# Patient Record
Sex: MALE | Race: WHITE | NOT HISPANIC OR LATINO | Employment: FULL TIME | URBAN - METROPOLITAN AREA
[De-identification: names, ages, dates, MRNs, and addresses within clinical notes are randomized per-mention and may not be internally consistent; named-entity substitution may affect disease eponyms.]

---

## 2022-03-28 ENCOUNTER — APPOINTMENT (OUTPATIENT)
Dept: CT IMAGING | Facility: CLINIC | Age: 27
End: 2022-03-28
Attending: EMERGENCY MEDICINE
Payer: COMMERCIAL

## 2022-03-28 ENCOUNTER — HOSPITAL ENCOUNTER (EMERGENCY)
Facility: CLINIC | Age: 27
Discharge: HOME | End: 2022-03-28
Attending: EMERGENCY MEDICINE | Admitting: EMERGENCY MEDICINE
Payer: COMMERCIAL

## 2022-03-28 ENCOUNTER — APPOINTMENT (OUTPATIENT)
Dept: GENERAL RADIOLOGY | Facility: CLINIC | Age: 27
End: 2022-03-28
Attending: EMERGENCY MEDICINE
Payer: COMMERCIAL

## 2022-03-28 VITALS
RESPIRATION RATE: 18 BRPM | TEMPERATURE: 98.3 F | HEART RATE: 61 BPM | SYSTOLIC BLOOD PRESSURE: 106 MMHG | OXYGEN SATURATION: 100 % | DIASTOLIC BLOOD PRESSURE: 76 MMHG

## 2022-03-28 DIAGNOSIS — J06.9 UPPER RESPIRATORY TRACT INFECTION, UNSPECIFIED TYPE: ICD-10-CM

## 2022-03-28 DIAGNOSIS — R55 VASOVAGAL SYNCOPE: ICD-10-CM

## 2022-03-28 LAB
ALBUMIN UR-MCNC: NEGATIVE MG/DL
ANION GAP SERPL CALCULATED.3IONS-SCNC: 2 MMOL/L (ref 3–14)
APPEARANCE UR: CLEAR
ATRIAL RATE - MUSE: 76 BPM
BASOPHILS # BLD AUTO: 0.1 10E3/UL (ref 0–0.2)
BASOPHILS NFR BLD AUTO: 0 %
BILIRUB UR QL STRIP: NEGATIVE
BUN SERPL-MCNC: 14 MG/DL (ref 7–30)
CALCIUM SERPL-MCNC: 9.1 MG/DL (ref 8.5–10.1)
CHLORIDE BLD-SCNC: 106 MMOL/L (ref 94–109)
CO2 SERPL-SCNC: 27 MMOL/L (ref 20–32)
COLOR UR AUTO: ABNORMAL
CREAT SERPL-MCNC: 0.78 MG/DL (ref 0.66–1.25)
DEPRECATED S PYO AG THROAT QL EIA: NEGATIVE
DIASTOLIC BLOOD PRESSURE - MUSE: NORMAL MMHG
EOSINOPHIL # BLD AUTO: 0.1 10E3/UL (ref 0–0.7)
EOSINOPHIL NFR BLD AUTO: 0 %
ERYTHROCYTE [DISTWIDTH] IN BLOOD BY AUTOMATED COUNT: 11.9 % (ref 10–15)
FLUAV RNA SPEC QL NAA+PROBE: NEGATIVE
FLUBV RNA RESP QL NAA+PROBE: NEGATIVE
GFR SERPL CREATININE-BSD FRML MDRD: >90 ML/MIN/1.73M2
GLUCOSE BLD-MCNC: 112 MG/DL (ref 70–99)
GLUCOSE UR STRIP-MCNC: NEGATIVE MG/DL
GROUP A STREP BY PCR: NOT DETECTED
HCT VFR BLD AUTO: 47 % (ref 40–53)
HGB BLD-MCNC: 15.9 G/DL (ref 13.3–17.7)
HGB UR QL STRIP: NEGATIVE
IMM GRANULOCYTES # BLD: 0.2 10E3/UL
IMM GRANULOCYTES NFR BLD: 1 %
INTERPRETATION ECG - MUSE: NORMAL
KETONES UR STRIP-MCNC: NEGATIVE MG/DL
LEUKOCYTE ESTERASE UR QL STRIP: NEGATIVE
LYMPHOCYTES # BLD AUTO: 1.2 10E3/UL (ref 0.8–5.3)
LYMPHOCYTES NFR BLD AUTO: 4 %
MCH RBC QN AUTO: 28.7 PG (ref 26.5–33)
MCHC RBC AUTO-ENTMCNC: 33.8 G/DL (ref 31.5–36.5)
MCV RBC AUTO: 85 FL (ref 78–100)
MONOCYTES # BLD AUTO: 1.7 10E3/UL (ref 0–1.3)
MONOCYTES NFR BLD AUTO: 6 %
MONOCYTES NFR BLD AUTO: NEGATIVE %
MUCOUS THREADS #/AREA URNS LPF: PRESENT /LPF
NEUTROPHILS # BLD AUTO: 24.7 10E3/UL (ref 1.6–8.3)
NEUTROPHILS NFR BLD AUTO: 89 %
NITRATE UR QL: NEGATIVE
NRBC # BLD AUTO: 0 10E3/UL
NRBC BLD AUTO-RTO: 0 /100
P AXIS - MUSE: 59 DEGREES
PH UR STRIP: 7.5 [PH] (ref 5–7)
PLATELET # BLD AUTO: 216 10E3/UL (ref 150–450)
POTASSIUM BLD-SCNC: 4.1 MMOL/L (ref 3.4–5.3)
PR INTERVAL - MUSE: 148 MS
QRS DURATION - MUSE: 94 MS
QT - MUSE: 352 MS
QTC - MUSE: 396 MS
R AXIS - MUSE: 84 DEGREES
RBC # BLD AUTO: 5.54 10E6/UL (ref 4.4–5.9)
RBC URINE: <1 /HPF
SARS-COV-2 RNA RESP QL NAA+PROBE: NEGATIVE
SODIUM SERPL-SCNC: 135 MMOL/L (ref 133–144)
SP GR UR STRIP: 1.02 (ref 1–1.03)
SYSTOLIC BLOOD PRESSURE - MUSE: NORMAL MMHG
T AXIS - MUSE: 61 DEGREES
UROBILINOGEN UR STRIP-MCNC: NORMAL MG/DL
VENTRICULAR RATE- MUSE: 76 BPM
WBC # BLD AUTO: 28 10E3/UL (ref 4–11)
WBC URINE: 1 /HPF

## 2022-03-28 PROCEDURE — 250N000011 HC RX IP 250 OP 636: Performed by: EMERGENCY MEDICINE

## 2022-03-28 PROCEDURE — 99285 EMERGENCY DEPT VISIT HI MDM: CPT | Mod: 25

## 2022-03-28 PROCEDURE — 87636 SARSCOV2 & INF A&B AMP PRB: CPT | Performed by: EMERGENCY MEDICINE

## 2022-03-28 PROCEDURE — 93005 ELECTROCARDIOGRAM TRACING: CPT

## 2022-03-28 PROCEDURE — 81003 URINALYSIS AUTO W/O SCOPE: CPT | Performed by: EMERGENCY MEDICINE

## 2022-03-28 PROCEDURE — 80048 BASIC METABOLIC PNL TOTAL CA: CPT | Performed by: EMERGENCY MEDICINE

## 2022-03-28 PROCEDURE — 71046 X-RAY EXAM CHEST 2 VIEWS: CPT

## 2022-03-28 PROCEDURE — 36415 COLL VENOUS BLD VENIPUNCTURE: CPT | Performed by: EMERGENCY MEDICINE

## 2022-03-28 PROCEDURE — 87651 STREP A DNA AMP PROBE: CPT | Performed by: EMERGENCY MEDICINE

## 2022-03-28 PROCEDURE — 86308 HETEROPHILE ANTIBODY SCREEN: CPT | Performed by: EMERGENCY MEDICINE

## 2022-03-28 PROCEDURE — 85025 COMPLETE CBC W/AUTO DIFF WBC: CPT | Performed by: EMERGENCY MEDICINE

## 2022-03-28 PROCEDURE — 70450 CT HEAD/BRAIN W/O DYE: CPT

## 2022-03-28 PROCEDURE — C9803 HOPD COVID-19 SPEC COLLECT: HCPCS

## 2022-03-28 RX ORDER — AZITHROMYCIN 250 MG/1
TABLET, FILM COATED ORAL
Qty: 6 TABLET | Refills: 0 | Status: SHIPPED | OUTPATIENT
Start: 2022-03-28 | End: 2022-04-02

## 2022-03-28 RX ORDER — ONDANSETRON 4 MG/1
4 TABLET, ORALLY DISINTEGRATING ORAL ONCE
Status: COMPLETED | OUTPATIENT
Start: 2022-03-28 | End: 2022-03-28

## 2022-03-28 RX ADMIN — ONDANSETRON 4 MG: 4 TABLET, ORALLY DISINTEGRATING ORAL at 09:43

## 2022-03-28 ASSESSMENT — ENCOUNTER SYMPTOMS
MYALGIAS: 1
COUGH: 1

## 2022-03-28 NOTE — Clinical Note
Jose Cooley was seen and treated in our emergency department on 3/28/2022.  He may return to work on 03/31/2022.       If you have any questions or concerns, please don't hesitate to call.      Price Horton MD

## 2022-03-28 NOTE — ED PROVIDER NOTES
History   Chief Complaint:  Syncope       HPI   Jose Cooley is an otherwise healthy 27 year old male who presents after a syncopal episode. The patient reports that he began feeling nauseous immediately after urinating this morning and subsequently passed out. He fell backwards and hit his head on the tub during this episode. All of this occurred shortly after he woke up this morning. Here in the ED, he states that the top off his head hurts. He also endorses pain in all four extremities and states that he has noticed a cough and congestion since the episode occurred. The patient states that he otherwise felt normal prior to going to bed last night. Denies personal or family history of frequent syncopal episodes. Also denies recent long travel, significant diet changes, or known sick contacts. He has not been COVID vaccinated.     Review of Systems   HENT: Positive for congestion.    Respiratory: Positive for cough.    Musculoskeletal: Positive for myalgias.   Neurological: Positive for syncope.        Endorses head trauma and loss of consciousness   All other systems reviewed and are negative.        Allergies:  No known drug allergies     Medications:  No known current medications     Past Medical History:     No known past medical history     Past Surgical History:    No known past surgical history     Family History:    No known family history     Social History:  Endorses occasional alcohol use  Denies smoking tobacco    Physical Exam     Patient Vitals for the past 24 hrs:   BP Temp Pulse Resp SpO2   03/28/22 1205 106/76 -- 61 18 100 %   03/28/22 0743 131/74 98.3  F (36.8  C) 106 18 100 %       Physical Exam  Constitutional:       General: He is not in acute distress.     Appearance: He is not diaphoretic.   HENT:      Head: Atraumatic.   Eyes:      General: No scleral icterus.     Pupils: Pupils are equal, round, and reactive to light.   Cardiovascular:      Heart sounds: Normal heart sounds.    Pulmonary:      Effort: No respiratory distress.      Breath sounds: Normal breath sounds.   Abdominal:      General: Bowel sounds are normal.      Palpations: Abdomen is soft.      Tenderness: There is no abdominal tenderness.   Musculoskeletal:         General: No tenderness.   Skin:     General: Skin is warm.      Findings: No rash.           Emergency Department Course   ECG  ECG obtained at 0932, ECG read at 0934  Normal sinus rhythm  Normal ECG   Rate 76 bpm. MA interval 148 ms. QRS duration 94 ms. QT/QTc 352/396 ms. P-R-T axes 59 84 61.     Imaging:  XR Chest 2 Views   Final Result   IMPRESSION: Normal two views of the chest.       DOMINGA RIBERA MD            SYSTEM ID:  [id number]      CT Head w/o Contrast   Final Result   IMPRESSION:   No intracranial hemorrhage, mass, or definite CT evidence of recent   ischemia.      AMBER DAMON MD            SYSTEM ID:  [id number]        Report per radiology    Laboratory:  Labs Ordered and Resulted from Time of ED Arrival to Time of ED Departure   BASIC METABOLIC PANEL - Abnormal       Result Value    Sodium 135      Potassium 4.1      Chloride 106      Carbon Dioxide (CO2) 27      Anion Gap 2 (*)     Urea Nitrogen 14      Creatinine 0.78      Calcium 9.1      Glucose 112 (*)     GFR Estimate >90     ROUTINE UA WITH MICROSCOPIC REFLEX TO CULTURE - Abnormal    Color Urine Light Yellow      Appearance Urine Clear      Glucose Urine Negative      Bilirubin Urine Negative      Ketones Urine Negative      Specific Gravity Urine 1.025      Blood Urine Negative      pH Urine 7.5 (*)     Protein Albumin Urine Negative      Urobilinogen Urine Normal      Nitrite Urine Negative      Leukocyte Esterase Urine Negative      Mucus Urine Present (*)     RBC Urine <1      WBC Urine 1     CBC WITH PLATELETS AND DIFFERENTIAL - Abnormal    WBC Count 28.0 (*)     RBC Count 5.54      Hemoglobin 15.9      Hematocrit 47.0      MCV 85      MCH 28.7      MCHC 33.8      RDW 11.9       Platelet Count 216      % Neutrophils 89      % Lymphocytes 4      % Monocytes 6      % Eosinophils 0      % Basophils 0      % Immature Granulocytes 1      NRBCs per 100 WBC 0      Absolute Neutrophils 24.7 (*)     Absolute Lymphocytes 1.2      Absolute Monocytes 1.7 (*)     Absolute Eosinophils 0.1      Absolute Basophils 0.1      Absolute Immature Granulocytes 0.2      Absolute NRBCs 0.0     INFLUENZA A/B & SARS-COV2 PCR MULTIPLEX - Normal    Influenza A PCR Negative      Influenza B PCR Negative      SARS CoV2 PCR Negative     MONONUCLEOSIS SCREEN - Normal    Mononucleosis Screen Negative     STREPTOCOCCUS A RAPID SCREEN W REFELX TO PCR - Normal    Group A Strep antigen Negative     GROUP A STREPTOCOCCUS PCR THROAT SWAB      Emergency Department Course:    Reviewed:  I reviewed nursing notes, vitals, past medical history and Care Everywhere    Assessments:  0918 I obtained history and examined the patient as noted above.   1052 I rechecked the patient and explained findings.   1218 I rechecked the patient.     Interventions:  Medications   ondansetron (ZOFRAN-ODT) ODT tab 4 mg (4 mg Oral Given 3/28/22 0943)     Disposition:  The patient was discharged to home.     Impression & Plan     Medical Decision Making:  This patient is a 27-year-old man who presents to the emergency department for evaluation of a syncopal episode.  He felt well yesterday.  On awakening today the patient says that he was coughing and felt generally lightheaded.  He had post micturition syncope and struck his head.  Was no wound on the scalp.  Patient complains of a headache but head CT is negative.  There is no personal or family history of prolonged QT, WPW, Brugada syndrome, or left ventricular hypertrophy.  This is not evident on his EKG.  Intervals are normal.    Laboratory work-up demonstrates a leukocytosis with a white count of 28.  Differential however is normal and there is no bandemia.  Is not clear whether this is leukocyte  demargination following the syncopal episode or whether may be related to his cough.  He does not have any nuchal rigidity or confusion to suggest CNS infection.  Chest x-ray does not demonstrate pneumonia.  Covid and influenza swabs negative as well as strep and mononucleosis.  Urine does not demonstrate evidence of an infection.  There are no abdominal tenderness or vomiting to suggest an intra-abdominal infection.    The patient has been able to orally hydrate.  He rested here in the ED.  He is afebrile and he overall feels improved.  Given his cough and leukocytosis and the syncope today he will be covered with azithromycin.  We discussed very specific return precautions in terms of a potentially worsening infection.            Diagnosis:    ICD-10-CM    1. Vasovagal syncope  R55    2. Upper respiratory tract infection, unspecified type  J06.9        Discharge Medications:  Discharge Medication List as of 3/28/2022 12:22 PM      START taking these medications    Details   azithromycin (ZITHROMAX Z-CHRISTOPHER) 250 MG tablet Two tablets on the first day, then one tablet daily for the next 4 days, Disp-6 tablet, R-0, E-Prescribe             Scribe Disclosure:  Rafael RUSS, am serving as a scribe at 9:18 AM on 3/28/2022 to document services personally performed by Price Horton MD based on my observations and the provider's statements to me.              Price Horton MD  03/28/22 [id number]

## 2022-03-28 NOTE — DISCHARGE INSTRUCTIONS
Return to the ER for worsening pain, fever, difficulty breathing, worsening dizziness or recurrent loss of consciousness, or for any new concerns.    Follow-up with your physician in 2 to 3 days for recheck.    Drink plenty of fluids to stay hydrated.

## 2022-03-28 NOTE — ED TRIAGE NOTES
Patient presents with complaints of syncopal episode this morning. Patient states he got up to use the bathroom and passed out while standing. Patient states he did hit his head. Denies any chest pain, SOB, or cervical tenderness. ABC intact without need for intervention at this time.

## 2022-08-17 ENCOUNTER — OFFICE VISIT (OUTPATIENT)
Dept: URGENT CARE | Facility: URGENT CARE | Age: 27
End: 2022-08-17

## 2022-08-17 VITALS
SYSTOLIC BLOOD PRESSURE: 120 MMHG | DIASTOLIC BLOOD PRESSURE: 78 MMHG | TEMPERATURE: 98 F | RESPIRATION RATE: 20 BRPM | OXYGEN SATURATION: 98 % | HEART RATE: 78 BPM

## 2022-08-17 DIAGNOSIS — J06.9 VIRAL URI WITH COUGH: Primary | ICD-10-CM

## 2022-08-17 LAB
DEPRECATED S PYO AG THROAT QL EIA: NEGATIVE
GROUP A STREP BY PCR: NOT DETECTED
SARS-COV-2 RNA RESP QL NAA+PROBE: NEGATIVE

## 2022-08-17 PROCEDURE — 87651 STREP A DNA AMP PROBE: CPT | Performed by: PHYSICIAN ASSISTANT

## 2022-08-17 PROCEDURE — U0005 INFEC AGEN DETEC AMPLI PROBE: HCPCS | Performed by: PHYSICIAN ASSISTANT

## 2022-08-17 PROCEDURE — U0003 INFECTIOUS AGENT DETECTION BY NUCLEIC ACID (DNA OR RNA); SEVERE ACUTE RESPIRATORY SYNDROME CORONAVIRUS 2 (SARS-COV-2) (CORONAVIRUS DISEASE [COVID-19]), AMPLIFIED PROBE TECHNIQUE, MAKING USE OF HIGH THROUGHPUT TECHNOLOGIES AS DESCRIBED BY CMS-2020-01-R: HCPCS | Performed by: PHYSICIAN ASSISTANT

## 2022-08-17 PROCEDURE — 99203 OFFICE O/P NEW LOW 30 MIN: CPT | Mod: CS | Performed by: PHYSICIAN ASSISTANT

## 2022-08-17 NOTE — LETTER
[facility] [place]  [address]  [address]  [place] [address]  Phone: [phone]  Fax: [phone]    August 17, 2022        Jose Cooley  [id number]  [facility] [address]          To whom it may concern:    RE: Jose Cooley    Patient was seen and treated today at our clinic. Please excuse for missed work 8/15/22 - 8/18/22.  May return to work tomorrow 8/17/2022, as long as COVID test is negative.     Please contact me for questions or concerns.      Sincerely,        Celia Montiel PA-C

## 2022-08-18 ENCOUNTER — TELEPHONE (OUTPATIENT)
Dept: URGENT CARE | Facility: URGENT CARE | Age: 27
End: 2022-08-18

## 2022-08-18 NOTE — TELEPHONE ENCOUNTER
Reason for Call:  Request for results:    Name of test or procedure: labs    Date of test of procedure: 8-18    Location of the test or procedure: [facility]    OK to leave the result message on voice mail or with a family member? YES    Phone number Patient can be reached at:  Home number on file [phone] (home)    Additional comments: please call with results.    Call taken on 8/18/2022 at 1:18 PM by Rosa Motley

## 2022-08-18 NOTE — TELEPHONE ENCOUNTER
Called spoke with patient. Reviewed provider results. Patient confirmed understanding and in agreement with information. Latosha Caballero MA

## 2025-03-04 ENCOUNTER — OFFICE VISIT (OUTPATIENT)
Dept: URGENT CARE | Facility: URGENT CARE | Age: 30
End: 2025-03-04
Payer: COMMERCIAL

## 2025-03-04 VITALS
SYSTOLIC BLOOD PRESSURE: 106 MMHG | WEIGHT: 139.5 LBS | TEMPERATURE: 98.7 F | DIASTOLIC BLOOD PRESSURE: 68 MMHG | HEART RATE: 75 BPM | OXYGEN SATURATION: 99 % | RESPIRATION RATE: 14 BRPM

## 2025-03-04 DIAGNOSIS — K59.00 CONSTIPATION, UNSPECIFIED CONSTIPATION TYPE: ICD-10-CM

## 2025-03-04 DIAGNOSIS — J10.1 INFLUENZA B: Primary | ICD-10-CM

## 2025-03-04 DIAGNOSIS — R52 BODY ACHES: ICD-10-CM

## 2025-03-04 LAB
FLUAV AG SPEC QL IA: NEGATIVE
FLUBV AG SPEC QL IA: POSITIVE

## 2025-03-04 PROCEDURE — 3074F SYST BP LT 130 MM HG: CPT | Performed by: PHYSICIAN ASSISTANT

## 2025-03-04 PROCEDURE — 3078F DIAST BP <80 MM HG: CPT | Performed by: PHYSICIAN ASSISTANT

## 2025-03-04 PROCEDURE — 99213 OFFICE O/P EST LOW 20 MIN: CPT | Performed by: PHYSICIAN ASSISTANT

## 2025-03-04 PROCEDURE — 87804 INFLUENZA ASSAY W/OPTIC: CPT | Performed by: PHYSICIAN ASSISTANT

## 2025-03-04 NOTE — PROGRESS NOTES
Assessment & Plan     Influenza B  Acute problem.  We discussed Tamiflu, patient declines at this time. On exam patient is in no acute respiratory distress.  Lungs are clear.  Supportive care measures advised.  Recommended to push fluids.  Rest.  OTC cough medication as needed.  Patient educational information provided regarding course of symptoms.  Advised to keep monitoring symptoms.  Follow-up if any worsening symptoms.  Patient agrees with the plan.    Body aches  Influenza test is positive.  Please see above treatment recommendations.  - Influenza A & B Antigen - Clinic Collect    Constipation, unspecified constipation type  Acute problem.  On exam he is in no acute distress.  Recommended MiraLAX. Recommend to push fluids. He also notes IBgard  gut health supplement has helped in the past before he can take it as needed.  Follow-up if any worsening symptoms.  Patient agrees with the plan.         Return in about 1 week (around 3/11/2025) for Symptoms failing to improve.    Gabriela Wadsworth PA-C  [facility] is a 30 year old male who presents to clinic today for the following health issues:  Chief Complaint   Patient presents with    Urgent Care     Patient presents with diarrhea that changed to constipation x 1 day.  He also is having nausea, vomiting today.   He also has a cough that started last night, with sweats, body aches.       HPI    Patient is presenting to urgent care today with 2 complaints.    First complaint is cough, body ache, sweats, chills since last night.  Vomited once today.  Treatment tried: Fluids.    Second complaint is he feels constipated.  He had some loose stools yesterday but now feels constipated.  Denies any abdominal pain.  He notes a history of constipation. Has tried IBgard supplement in the past and it does help. Treatment tried: Fluids.      Review of Systems  Constitutional, HEENT, cardiovascular, pulmonary, GI, ,  musculoskeletal, neuro, skin, endocrine and psych systems are negative, except as otherwise noted.      Objective    /68   Pulse 75   Temp 98.7  F (37.1  C) (Tympanic)   Resp 14   Wt 63.3 kg (139 lb 8 oz)   SpO2 99%   Physical Exam   GENERAL: alert and no distress  HENT: ear canals and TM's normal, mouth without ulcers or lesions, throat is moist and pink, uvula is midline  RESP: lungs clear to auscultation - no rales, rhonchi or wheezes  CV: regular rate and rhythm, normal S1 S2  ABDOMEN: soft, nontender, no hepatosplenomegaly, no masses and bowel sounds normal  MS: no gross musculoskeletal defects noted, no edema      Results for orders placed or performed in visit on 03/04/25 (from the past 24 hours)   Influenza A & B Antigen - Clinic Collect    Specimen: Nose; Swab   Result Value Ref Range    Influenza A antigen Negative Negative    Influenza B antigen Positive (A) Negative    Narrative    Test results must be correlated with clinical data. If necessary, results should be confirmed by a molecular assay or viral culture.

## 2025-03-04 NOTE — LETTER
March 4, 2025      Jose Cooley  [address]  [facility] [address]        To Whom It May Concern:    Jose Cooley  was seen on 3/4/2025  Please excuse his absence from work 3/4, 3/5 and 3/6  due to  acute medical illness.    Dx: Influenza B        Sincerely,        Gabriela Wadsworth PA-C    Electronically signed